# Patient Record
Sex: FEMALE | URBAN - NONMETROPOLITAN AREA
[De-identification: names, ages, dates, MRNs, and addresses within clinical notes are randomized per-mention and may not be internally consistent; named-entity substitution may affect disease eponyms.]

---

## 2023-06-23 ENCOUNTER — OFFICE VISIT (OUTPATIENT)
Dept: URBAN - NONMETROPOLITAN AREA CLINIC 4 | Facility: CLINIC | Age: 33
End: 2023-06-23

## 2025-02-26 ENCOUNTER — DASHBOARD ENCOUNTERS (OUTPATIENT)
Age: 35
End: 2025-02-26

## 2025-02-26 ENCOUNTER — OFFICE VISIT (OUTPATIENT)
Dept: URBAN - NONMETROPOLITAN AREA CLINIC 2 | Facility: CLINIC | Age: 35
End: 2025-02-26
Payer: COMMERCIAL

## 2025-02-26 VITALS
HEIGHT: 63 IN | SYSTOLIC BLOOD PRESSURE: 145 MMHG | WEIGHT: 141 LBS | DIASTOLIC BLOOD PRESSURE: 89 MMHG | HEART RATE: 106 BPM | BODY MASS INDEX: 24.98 KG/M2

## 2025-02-26 DIAGNOSIS — R19.5 MUCUS IN STOOL: ICD-10-CM

## 2025-02-26 DIAGNOSIS — R10.30 LOWER ABDOMINAL PAIN: ICD-10-CM

## 2025-02-26 DIAGNOSIS — K90.0 CELIAC DISEASE: ICD-10-CM

## 2025-02-26 PROBLEM — 396331005: Status: ACTIVE | Noted: 2025-02-26

## 2025-02-26 PROCEDURE — 99204 OFFICE O/P NEW MOD 45 MIN: CPT | Performed by: INTERNAL MEDICINE

## 2025-02-26 RX ORDER — GABAPENTIN 300 MG/1
1 CAPSULE CAPSULE ORAL ONCE A DAY
Status: ACTIVE | COMMUNITY

## 2025-02-26 RX ORDER — HYDROCORTISONE 5 MG/1
1 TABLET WITH FOOD OR MILK TABLET ORAL
Status: ACTIVE | COMMUNITY

## 2025-02-26 RX ORDER — FLUDROCORTISONE ACETATE 0.1 MG/1
1 TABLET TABLET ORAL ONCE A DAY
Status: ACTIVE | COMMUNITY

## 2025-02-26 RX ORDER — SODIUM, POTASSIUM,MAG SULFATES 17.5-3.13G
AS DIRECTED SOLUTION, RECONSTITUTED, ORAL ORAL
Qty: 1 | Refills: 0 | OUTPATIENT
Start: 2025-02-26 | End: 2025-02-28

## 2025-02-26 NOTE — HPI-TODAY'S VISIT:
Christina is a pleasant 34-year-old female with a history of [place]'s disease who presents for evaluation of positive celiac serologies.  She has GI symptoms to include bloating, mucus in her stool, lower abdominal pain, and fatigue.  Her endocrinologist checked her celiac serologies and therefore all very elevated.  She has been gluten-free for about 6 weeks and is starting to feel better.  She has not had EGD and colonoscopy in the past.  She is still having some mucus in her stools and some lower abdominal discomfort of uncertain etiology.  She is on chronic steroid for her [place] disease which was diagnosed in 2023.

## 2025-03-10 ENCOUNTER — LAB OUTSIDE AN ENCOUNTER (OUTPATIENT)
Dept: URBAN - NONMETROPOLITAN AREA CLINIC 2 | Facility: CLINIC | Age: 35
End: 2025-03-10

## 2025-03-10 ENCOUNTER — OFFICE VISIT (OUTPATIENT)
Dept: URBAN - METROPOLITAN AREA MEDICAL CENTER 1 | Facility: MEDICAL CENTER | Age: 35
End: 2025-03-10
Payer: COMMERCIAL

## 2025-03-10 ENCOUNTER — TELEPHONE ENCOUNTER (OUTPATIENT)
Dept: URBAN - NONMETROPOLITAN AREA CLINIC 2 | Facility: CLINIC | Age: 35
End: 2025-03-10

## 2025-03-10 DIAGNOSIS — K21.00 ALKALINE REFLUX ESOPHAGITIS: ICD-10-CM

## 2025-03-10 DIAGNOSIS — K31.89 OTHER DISEASES OF STOMACH AND DUODENUM: ICD-10-CM

## 2025-03-10 DIAGNOSIS — D12.2 ADENOMA OF ASCENDING COLON: ICD-10-CM

## 2025-03-10 DIAGNOSIS — R19.7 ACUTE DIARRHEA: ICD-10-CM

## 2025-03-10 PROBLEM — 266433003: Status: ACTIVE | Noted: 2025-03-10

## 2025-03-10 PROCEDURE — 45385 COLONOSCOPY W/LESION REMOVAL: CPT | Performed by: INTERNAL MEDICINE

## 2025-03-10 PROCEDURE — 43239 EGD BIOPSY SINGLE/MULTIPLE: CPT | Performed by: INTERNAL MEDICINE

## 2025-03-10 PROCEDURE — 45380 COLONOSCOPY AND BIOPSY: CPT | Performed by: INTERNAL MEDICINE

## 2025-03-10 RX ORDER — OMEPRAZOLE 40 MG/1
1 CAPSULE 1/2 TO 1 HOUR BEFORE MORNING MEAL CAPSULE, DELAYED RELEASE ORAL ONCE A DAY
Qty: 90 | Refills: 3 | OUTPATIENT
Start: 2025-03-10

## 2025-03-10 RX ORDER — GABAPENTIN 300 MG/1
1 CAPSULE CAPSULE ORAL ONCE A DAY
Status: ACTIVE | COMMUNITY

## 2025-03-10 RX ORDER — HYDROCORTISONE 5 MG/1
1 TABLET WITH FOOD OR MILK TABLET ORAL
Status: ACTIVE | COMMUNITY

## 2025-03-10 RX ORDER — FLUDROCORTISONE ACETATE 0.1 MG/1
1 TABLET TABLET ORAL ONCE A DAY
Status: ACTIVE | COMMUNITY

## 2025-03-11 LAB
AP CASE REPORT: (no result)
AP DIAGNOSIS COMMENT: (no result)
AP FINAL DIAGNOSIS: (no result)
AP GROSS DESCRIPTION: (no result)
AP MICROSCOPIC DESCRIPTION: (no result)

## 2025-03-12 ENCOUNTER — TELEPHONE ENCOUNTER (OUTPATIENT)
Dept: URBAN - NONMETROPOLITAN AREA CLINIC 2 | Facility: CLINIC | Age: 35
End: 2025-03-12

## 2025-05-05 ENCOUNTER — OFFICE VISIT (OUTPATIENT)
Dept: URBAN - NONMETROPOLITAN AREA CLINIC 2 | Facility: CLINIC | Age: 35
End: 2025-05-05
Payer: SELF-PAY

## 2025-05-05 DIAGNOSIS — K90.0 CELIAC DISEASE: ICD-10-CM

## 2025-05-05 DIAGNOSIS — R10.30 LOWER ABDOMINAL PAIN: ICD-10-CM

## 2025-05-05 DIAGNOSIS — R19.5 MUCUS IN STOOL: ICD-10-CM

## 2025-05-05 DIAGNOSIS — R19.4 CHANGE IN BOWEL HABITS: ICD-10-CM

## 2025-05-05 DIAGNOSIS — K21.00 GASTROESOPHAGEAL REFLUX DISEASE WITH ESOPHAGITIS WITHOUT HEMORRHAGE: ICD-10-CM

## 2025-05-05 DIAGNOSIS — D36.9 ADENOMATOUS POLYP: ICD-10-CM

## 2025-05-05 PROCEDURE — 99214 OFFICE O/P EST MOD 30 MIN: CPT | Performed by: NURSE PRACTITIONER

## 2025-05-05 RX ORDER — FLUDROCORTISONE ACETATE 0.1 MG/1
1 TABLET TABLET ORAL ONCE A DAY
Status: ACTIVE | COMMUNITY

## 2025-05-05 RX ORDER — OMEPRAZOLE 40 MG/1
1 CAPSULE 1/2 TO 1 HOUR BEFORE MORNING MEAL CAPSULE, DELAYED RELEASE ORAL ONCE A DAY
Qty: 90 | Refills: 3 | Status: ACTIVE | COMMUNITY
Start: 2025-03-10

## 2025-05-05 RX ORDER — OMEPRAZOLE 40 MG/1
1 CAPSULE 1/2 TO 1 HOUR BEFORE MORNING MEAL CAPSULE, DELAYED RELEASE ORAL ONCE A DAY
Qty: 90 | Refills: 3 | OUTPATIENT
Start: 2025-05-05

## 2025-05-05 RX ORDER — GABAPENTIN 300 MG/1
1 CAPSULE CAPSULE ORAL ONCE A DAY
Status: ACTIVE | COMMUNITY

## 2025-05-05 RX ORDER — CYCLOBENZAPRINE HYDROCHLORIDE 10 MG/1
1 TABLET AT BEDTIME AS NEEDED TABLET, FILM COATED ORAL ONCE A DAY
Qty: 30 | Status: ACTIVE | COMMUNITY
Start: 2025-05-05

## 2025-05-05 RX ORDER — HYDROCORTISONE 5 MG/1
1 TABLET WITH FOOD OR MILK TABLET ORAL
Status: ACTIVE | COMMUNITY

## 2025-05-05 NOTE — HPI-OTHER HISTORIES
2/26/2025:  Christina is a pleasant 34-year-old female with a history of [place]'s disease who presents for evaluation of positive celiac serologies. She has GI symptoms to include bloating, mucus in her stool, lower abdominal pain, and fatigue. Her endocrinologist checked her celiac serologies and therefore all very elevated. She has been gluten-free for about 6 weeks and is starting to feel better. She has not had EGD and colonoscopy in the past. She is still having some mucus in her stools and some lower abdominal discomfort of uncertain etiology. She is on chronic steroid for her [place]'s disease which was diagnosed in 2023.  3/10/2025:  Send in omeprazole 40 mg daily x 8 weeks to treat mild esophagitis noted on EGD today.  Christina returns for f/u of suspected celiac disease. EGD confirmed diagnosis. Lower abd pain has improved but she still has some bloating and fatigue. She took omeprazole for 6 weeks before stopping d/t constipation. No reflux symptoms since stopping. No bleeding. Weight stable. LG.

## 2025-05-10 LAB
A/G RATIO: 1.8
ABSOLUTE BASOPHILS: 11
ABSOLUTE EOSINOPHILS: 81
ABSOLUTE LYMPHOCYTES: 1199
ABSOLUTE MONOCYTES: 297
ABSOLUTE NEUTROPHILS: 3812
ALBUMIN: 4.6
ALKALINE PHOSPHATASE: 81
ALT (SGPT): 11
AST (SGOT): 16
BASOPHILS: 0.2
BILIRUBIN, TOTAL: 0.3
BUN/CREATININE RATIO: (no result)
BUN: 8
CALCIUM: 9.3
CARBON DIOXIDE, TOTAL: 22
CHLORIDE: 102
CREATININE: 0.63
EGFR: 119
ENDOMYSIAL ANTIBODY SCR: POSITIVE
ENDOMYSIAL ANTIBODY TITER: (no result)
EOSINOPHILS: 1.5
GLOBULIN, TOTAL: 2.5
GLUCOSE: 165
HEMATOCRIT: 46.7
HEMOGLOBIN: 15
IMMUNOGLOBULIN A: 123
INTERPRETATION: (no result)
IRON BIND.CAP.(TIBC): 275
IRON SATURATION: 29
IRON: 79
LYMPHOCYTES: 22.2
MCH: 30.4
MCHC: 32.1
MCV: 94.7
MONOCYTES: 5.5
MPV: 11.2
NEUTROPHILS: 70.6
PLATELET COUNT: 281
POTASSIUM: 4.2
PROTEIN, TOTAL: 7.1
RDW: 11.8
RED BLOOD CELL COUNT: 4.93
SODIUM: 137
TISSUE TRANSGLUTAMINASE AB, IGA: 52.8
VITAMIN B12: 758
VITAMIN D,25-OH,TOTAL,IA: 39
WHITE BLOOD CELL COUNT: 5.4

## 2025-05-12 ENCOUNTER — TELEPHONE ENCOUNTER (OUTPATIENT)
Dept: URBAN - NONMETROPOLITAN AREA CLINIC 2 | Facility: CLINIC | Age: 35
End: 2025-05-12

## 2025-05-13 ENCOUNTER — TELEPHONE ENCOUNTER (OUTPATIENT)
Dept: URBAN - NONMETROPOLITAN AREA CLINIC 2 | Facility: CLINIC | Age: 35
End: 2025-05-13